# Patient Record
Sex: MALE | Race: WHITE | NOT HISPANIC OR LATINO
[De-identification: names, ages, dates, MRNs, and addresses within clinical notes are randomized per-mention and may not be internally consistent; named-entity substitution may affect disease eponyms.]

---

## 2022-10-14 PROBLEM — Z00.00 ENCOUNTER FOR PREVENTIVE HEALTH EXAMINATION: Status: ACTIVE | Noted: 2022-10-14

## 2022-10-17 ENCOUNTER — APPOINTMENT (OUTPATIENT)
Dept: ORTHOPEDIC SURGERY | Facility: CLINIC | Age: 67
End: 2022-10-17

## 2022-10-17 VITALS — HEIGHT: 68 IN | WEIGHT: 192 LBS | BODY MASS INDEX: 29.1 KG/M2

## 2022-10-17 DIAGNOSIS — M51.27 OTHER INTERVERTEBRAL DISC DISPLACEMENT, LUMBOSACRAL REGION: ICD-10-CM

## 2022-10-17 DIAGNOSIS — M47.26 OTHER SPONDYLOSIS WITH RADICULOPATHY, LUMBAR REGION: ICD-10-CM

## 2022-10-17 PROCEDURE — 99203 OFFICE O/P NEW LOW 30 MIN: CPT

## 2022-10-17 PROCEDURE — 99213 OFFICE O/P EST LOW 20 MIN: CPT

## 2022-10-17 PROCEDURE — 72170 X-RAY EXAM OF PELVIS: CPT

## 2022-10-17 PROCEDURE — 72100 X-RAY EXAM L-S SPINE 2/3 VWS: CPT

## 2022-10-17 RX ORDER — NAPROXEN 500 MG/1
500 TABLET ORAL
Qty: 60 | Refills: 2 | Status: ACTIVE | COMMUNITY
Start: 2022-10-17 | End: 1900-01-01

## 2022-10-17 NOTE — PHYSICAL EXAM
[Flexion] : flexion [Extension] : extension [Bending to left] : bending to left [Bending to right] : bending to right [] : patient ambulates without assistive device [Facet arthropathy] : Facet arthropathy [Disc space narrowing] : Disc space narrowing [Scoliosis] : Scoliosis [AP] : anteroposterior [There are no fractures, subluxations or dislocations. No significant abnormalities are seen] : There are no fractures, subluxations or dislocations. No significant abnormalities are seen [de-identified] : radicular complaints down lateral left leg to the level of the knee

## 2022-10-17 NOTE — HISTORY OF PRESENT ILLNESS
[Sudden] : sudden [Occasional] : occasional [Meds] : meds [de-identified] : 10-17-22- He is for evaluation 3+ month history of lower back pain with radicular complaints laterally down the left leg to the level of the knee. He states symptoms came on after falling 5+ feet off a ladder onto his left side. Initially saw chiro and another ortho. He was told no fractures. states pain has been worse with start up and prolonged standing.\par He saw an urgent care over the last week for an URI and was given rx for prednisone 20mg per day for 5 days and notes significant improvement in his back and leg symptoms.\par \par He states 20+ years ago l5 hnp did well with "high level naprosyn" he said he did naprosyn 500mg q 4-6 hrs and that settled down his symptoms [] : no [FreeTextEntry1] : bk/left hip [FreeTextEntry3] : 3 months  [FreeTextEntry5] : patient fell off a ladder while doing work on his house . Pain has gotten better but hes still feeling pain  [FreeTextEntry7] : into the hip and knee on left side  [FreeTextEntry9] : steroids  [de-identified] : xrays

## 2022-10-17 NOTE — IMAGING
[FreeTextEntry1] : multi level ddd with narrowing worse at L5-s1 with significant posterior facet arthropathy / stenosis appreciated

## 2022-10-17 NOTE — ASSESSMENT
[FreeTextEntry1] : He has history of lumbar issues, had a fall from 5 + feet with new onset symptoms, and has been under the care of another physician. He has completed conservative measures with continued symptoms will get mri and f/u

## 2022-10-23 ENCOUNTER — FORM ENCOUNTER (OUTPATIENT)
Age: 67
End: 2022-10-23

## 2022-10-24 ENCOUNTER — APPOINTMENT (OUTPATIENT)
Dept: MRI IMAGING | Facility: CLINIC | Age: 67
End: 2022-10-24

## 2022-10-24 PROCEDURE — 72148 MRI LUMBAR SPINE W/O DYE: CPT

## 2022-10-31 ENCOUNTER — APPOINTMENT (OUTPATIENT)
Dept: ORTHOPEDIC SURGERY | Facility: CLINIC | Age: 67
End: 2022-10-31

## 2022-10-31 VITALS — WEIGHT: 192 LBS | HEIGHT: 68 IN | BODY MASS INDEX: 29.1 KG/M2

## 2022-10-31 PROCEDURE — 78315 BONE IMAGING 3 PHASE: CPT

## 2022-10-31 PROCEDURE — 99213 OFFICE O/P EST LOW 20 MIN: CPT

## 2022-10-31 NOTE — PHYSICAL EXAM
[Flexion] : flexion [Extension] : extension [Bending to left] : bending to left [Bending to right] : bending to right [] : patient ambulates without assistive device [Facet arthropathy] : Facet arthropathy [Disc space narrowing] : Disc space narrowing [Scoliosis] : Scoliosis [AP] : anteroposterior [There are no fractures, subluxations or dislocations. No significant abnormalities are seen] : There are no fractures, subluxations or dislocations. No significant abnormalities are seen [de-identified] : radicular complaints down lateral left leg to the level of the knee

## 2022-10-31 NOTE — HISTORY OF PRESENT ILLNESS
[Sudden] : sudden [Occasional] : occasional [Meds] : meds [de-identified] : 10-17-22- He is for evaluation 3+ month history of lower back pain with radicular complaints laterally down the left leg to the level of the knee. He states symptoms came on after falling 5+ feet off a ladder onto his left side. Initially saw chiro and another ortho. He was told no fractures. states pain has been worse with start up and prolonged standing.\par He saw an urgent care over the last week for an URI and was given rx for prednisone 20mg per day for 5 days and notes significant improvement in his back and leg symptoms.\par \par He states 20+ years ago l5 hnp did well with "high level naprosyn" he said he did naprosyn 500mg q 4-6 hrs and that settled down his symptoms [] : no [FreeTextEntry1] : bk/left hip [FreeTextEntry3] : 3 months  [FreeTextEntry5] : patient fell off a ladder while doing work on his house . Pain has gotten better but hes still feeling pain  [FreeTextEntry7] : into the hip and knee on left side  [FreeTextEntry9] : steroids  [de-identified] : xrays

## 2022-10-31 NOTE — REASON FOR VISIT
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 Its \par appearance is nonspecific. Differential diagnostic considerations include but are not limited to a bone island or a \par blastic metastasis in this patient with a history of melanoma (although melanoma metastases are typically lytic \par lesions, not blastic lesions, and are more often multiple than singular). If any prior imaging studies (MR or CT \par studies of the lumbar spine, abdomen or pelvis) are available for review they should be obtained and compared \par to this examination. If there are no prior studies available for comparison a SPECT bone scan may be of benefit \par in further characterization.

## 2022-10-31 NOTE — DISCUSSION/SUMMARY
[de-identified] : MRI reviewed, has subacute compression fx T12 likely  the cause of his back pain. Also has lesion L2- bone scan ordered\par Home exercises discussed as he won't do PT

## 2022-11-08 DIAGNOSIS — M89.9 DISORDER OF BONE, UNSPECIFIED: ICD-10-CM

## 2022-11-09 ENCOUNTER — RESULT REVIEW (OUTPATIENT)
Age: 67
End: 2022-11-09

## 2022-11-14 ENCOUNTER — APPOINTMENT (OUTPATIENT)
Dept: ORTHOPEDIC SURGERY | Facility: CLINIC | Age: 67
End: 2022-11-14

## 2022-11-14 VITALS — WEIGHT: 192 LBS | BODY MASS INDEX: 29.1 KG/M2 | HEIGHT: 68 IN

## 2022-11-14 DIAGNOSIS — S22.080D WEDGE COMPRESSION FRACTURE OF T11-T12 VERTEBRA, SUBSEQUENT ENCOUNTER FOR FRACTURE WITH ROUTINE HEALING: ICD-10-CM

## 2022-11-14 PROCEDURE — 99213 OFFICE O/P EST LOW 20 MIN: CPT

## 2022-11-14 NOTE — HISTORY OF PRESENT ILLNESS
[Sudden] : sudden [Occasional] : occasional [Meds] : meds [de-identified] : 10-17-22- He is for evaluation 3+ month history of lower back pain with radicular complaints laterally down the left leg to the level of the knee. He states symptoms came on after falling 5+ feet off a ladder onto his left side. Initially saw chiro and another ortho. He was told no fractures. states pain has been worse with start up and prolonged standing.\par He saw an urgent care over the last week for an URI and was given rx for prednisone 20mg per day for 5 days and notes significant improvement in his back and leg symptoms.\par \par He states 20+ years ago l5 hnp did well with "high level naprosyn" he said he did naprosyn 500mg q 4-6 hrs and that settled down his symptoms [] : no [FreeTextEntry1] : bk/left hip [FreeTextEntry3] : 3 months  [FreeTextEntry5] : patient fell off a ladder while doing work on his house . Pain has gotten better but hes still feeling pain  [FreeTextEntry7] : into the hip and knee on left side  [FreeTextEntry9] : steroids  [de-identified] : xrays

## 2022-11-14 NOTE — DISCUSSION/SUMMARY
[de-identified] : Reviewed bone scan, other than degenerative changes, T12 compression fx, nothing else of worry. HEP

## 2022-11-14 NOTE — PHYSICAL EXAM
[Flexion] : flexion [Extension] : extension [Bending to left] : bending to left [Bending to right] : bending to right [] : patient ambulates without assistive device [Facet arthropathy] : Facet arthropathy [Disc space narrowing] : Disc space narrowing [Scoliosis] : Scoliosis [AP] : anteroposterior [There are no fractures, subluxations or dislocations. No significant abnormalities are seen] : There are no fractures, subluxations or dislocations. No significant abnormalities are seen [de-identified] : radicular complaints down lateral left leg to the level of the knee

## 2022-11-14 NOTE — REASON FOR VISIT
[FreeTextEntry2] : 11/14/22- Had bone scan: Impression: Degenerative changes, increased activity at T12 vertebral body likely represents compression fracture \par 10/31/22- Had MRI: IMPRESSION:\par Multilevel lower thoracic and lumbar degenerative disc disease with a secondary dextroscoliosis.\par Disc bulges and/or disc herniations from T12-L1 through L5-S1 without stenosis or nerve root compression.\par Acute-subacute superimposed compression fracture of T12 with approximate 10% loss of height.\par Chronic Schmorl's nodes in L1 and L4.\par Modic type I endplate changes adjacent to the L4-5 disc.\par Well-defined 7 mm lesion in the L2 vertebral body that is decreased in signal on all pulse sequences. Its \par appearance is nonspecific. Differential diagnostic considerations include but are not limited to a bone island or a \par blastic metastasis in this patient with a history of melanoma (although melanoma metastases are typically lytic \par lesions, not blastic lesions, and are more often multiple than singular). If any prior imaging studies (MR or CT \par studies of the lumbar spine, abdomen or pelvis) are available for review they should be obtained and compared \par to this examination. If there are no prior studies available for comparison a SPECT bone scan may be of benefit \par in further characterization.

## 2022-11-14 NOTE — DATA REVIEWED
[Bone Scan] : bone scan [Whole Body] : whole body [I reviewed the films/CD and agree] : I reviewed the films/CD and agree

## 2023-11-08 ENCOUNTER — OFFICE (OUTPATIENT)
Dept: URBAN - METROPOLITAN AREA CLINIC 104 | Facility: CLINIC | Age: 68
Setting detail: OPHTHALMOLOGY
End: 2023-11-08
Payer: MEDICARE

## 2023-11-08 DIAGNOSIS — H15.101: ICD-10-CM

## 2023-11-08 DIAGNOSIS — H02.821: ICD-10-CM

## 2023-11-08 PROCEDURE — 99203 OFFICE O/P NEW LOW 30 MIN: CPT | Performed by: OPHTHALMOLOGY

## 2023-11-08 PROCEDURE — 92285 EXTERNAL OCULAR PHOTOGRAPHY: CPT | Performed by: OPHTHALMOLOGY

## 2023-11-08 ASSESSMENT — CONFRONTATIONAL VISUAL FIELD TEST (CVF)
OD_FINDINGS: FULL
OS_FINDINGS: FULL

## 2023-11-09 PROBLEM — H15.101 EPISCLERITIS, UNSPECIFIED; RIGHT EYE: Status: ACTIVE | Noted: 2023-11-08

## 2023-11-22 ENCOUNTER — OFFICE (OUTPATIENT)
Dept: URBAN - METROPOLITAN AREA CLINIC 104 | Facility: CLINIC | Age: 68
Setting detail: OPHTHALMOLOGY
End: 2023-11-22
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
## 2023-11-22 PROCEDURE — NO SHOW FE NO SHOW FEE: Performed by: OPHTHALMOLOGY

## 2024-08-09 ENCOUNTER — APPOINTMENT (OUTPATIENT)
Dept: CARDIOLOGY | Facility: CLINIC | Age: 69
End: 2024-08-09

## 2024-08-15 ENCOUNTER — APPOINTMENT (OUTPATIENT)
Dept: CARDIOLOGY | Facility: CLINIC | Age: 69
End: 2024-08-15

## 2024-09-16 ENCOUNTER — APPOINTMENT (OUTPATIENT)
Dept: OTOLARYNGOLOGY | Facility: CLINIC | Age: 69
End: 2024-09-16

## 2024-09-16 VITALS
HEART RATE: 92 BPM | SYSTOLIC BLOOD PRESSURE: 103 MMHG | HEIGHT: 68 IN | DIASTOLIC BLOOD PRESSURE: 68 MMHG | WEIGHT: 192 LBS | BODY MASS INDEX: 29.1 KG/M2

## 2024-09-16 DIAGNOSIS — Z82.49 FAMILY HISTORY OF ISCHEMIC HEART DISEASE AND OTHER DISEASES OF THE CIRCULATORY SYSTEM: ICD-10-CM

## 2024-09-16 DIAGNOSIS — Z87.891 PERSONAL HISTORY OF NICOTINE DEPENDENCE: ICD-10-CM

## 2024-09-16 DIAGNOSIS — Z83.438 FAMILY HISTORY OF OTHER DISORDER OF LIPOPROTEIN METABOLISM AND OTHER LIPIDEMIA: ICD-10-CM

## 2024-09-16 DIAGNOSIS — Z85.828 PERSONAL HISTORY OF OTHER MALIGNANT NEOPLASM OF SKIN: ICD-10-CM

## 2024-09-16 DIAGNOSIS — Z86.39 PERSONAL HISTORY OF OTHER ENDOCRINE, NUTRITIONAL AND METABOLIC DISEASE: ICD-10-CM

## 2024-09-16 DIAGNOSIS — Z87.19 PERSONAL HISTORY OF OTHER DISEASES OF THE DIGESTIVE SYSTEM: ICD-10-CM

## 2024-09-16 RX ORDER — METOPROLOL TARTRATE 75 MG/1
TABLET, FILM COATED ORAL
Refills: 0 | Status: ACTIVE | COMMUNITY

## 2024-09-16 RX ORDER — ATORVASTATIN CALCIUM 80 MG/1
TABLET, FILM COATED ORAL
Refills: 0 | Status: ACTIVE | COMMUNITY

## 2024-09-16 RX ORDER — ASPIRIN 81 MG/1
81 TABLET ORAL
Refills: 0 | Status: ACTIVE | COMMUNITY

## 2024-09-16 RX ORDER — APIXABAN 5 MG/1
TABLET, FILM COATED ORAL
Refills: 0 | Status: ACTIVE | COMMUNITY

## 2024-09-16 RX ORDER — PANTOPRAZOLE SODIUM 20 MG/1
TABLET, DELAYED RELEASE ORAL
Refills: 0 | Status: ACTIVE | COMMUNITY

## 2024-09-16 RX ORDER — SACUBITRIL AND VALSARTAN 49; 51 MG/1; MG/1
TABLET, FILM COATED ORAL
Refills: 0 | Status: ACTIVE | COMMUNITY

## 2024-09-18 NOTE — HISTORY OF PRESENT ILLNESS
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 Patient reports intermittent dysphagia especially with medications, pt states throat feels tight and voice becomes raspy after speaking for 5+ minutes. Denies odynophagia, dyspnea, or otalgia. Pt takes protonix daily. History of smoking and occasional alcohol use.

## 2024-09-18 NOTE — CONSULT LETTER
[Dear  ___] : Dear  [unfilled], [Courtesy Letter:] : I had the pleasure of seeing your patient, [unfilled], in my office today. [Please see my note below.] : Please see my note below. [Sincerely,] : Sincerely, [FreeTextEntry2] : Dr. Humphrey Lewis [FreeTextEntry3] : Justin Cano MD, FACS Chief of Otolaryngology at [facility]  Dept. of Otolaryngology [facility]

## 2024-09-18 NOTE — CONSULT LETTER
[Dear  ___] : Dear  [unfilled], [Courtesy Letter:] : I had the pleasure of seeing your patient, [unfilled], in my office today. [Please see my note below.] : Please see my note below. [Sincerely,] : Sincerely, [FreeTextEntry2] : Dr. Humphrey Lewis [FreeTextEntry3] : Justin Cano MD, FACS Chief of Otolaryngology at [facility]  Dept. of Otolaryngology [facility]

## 2024-09-18 NOTE — HISTORY OF PRESENT ILLNESS
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 Patient reports intermittent dysphagia especially with medications, pt states throat feels tight and voice becomes raspy after speaking for 5+ minutes. Denies odynophagia, dyspnea, or otalgia. Pt takes protonix daily. History of smoking and occasional alcohol use.